# Patient Record
Sex: FEMALE | Race: WHITE | NOT HISPANIC OR LATINO
[De-identification: names, ages, dates, MRNs, and addresses within clinical notes are randomized per-mention and may not be internally consistent; named-entity substitution may affect disease eponyms.]

---

## 2023-09-19 PROBLEM — Z00.00 ENCOUNTER FOR PREVENTIVE HEALTH EXAMINATION: Status: ACTIVE | Noted: 2023-09-19

## 2023-09-29 ENCOUNTER — APPOINTMENT (OUTPATIENT)
Dept: CT IMAGING | Facility: CLINIC | Age: 48
End: 2023-09-29
Payer: COMMERCIAL

## 2023-09-29 PROCEDURE — 70450 CT HEAD/BRAIN W/O DYE: CPT

## 2024-01-09 ENCOUNTER — APPOINTMENT (OUTPATIENT)
Dept: PAIN MANAGEMENT | Facility: CLINIC | Age: 49
End: 2024-01-09

## 2024-03-28 ENCOUNTER — APPOINTMENT (OUTPATIENT)
Dept: INFECTIOUS DISEASE | Facility: CLINIC | Age: 49
End: 2024-03-28
Payer: COMMERCIAL

## 2024-03-28 VITALS
TEMPERATURE: 97.3 F | WEIGHT: 155 LBS | DIASTOLIC BLOOD PRESSURE: 91 MMHG | HEIGHT: 65 IN | BODY MASS INDEX: 25.83 KG/M2 | SYSTOLIC BLOOD PRESSURE: 131 MMHG | HEART RATE: 93 BPM | OXYGEN SATURATION: 100 %

## 2024-03-28 DIAGNOSIS — Z86.19 PERSONAL HISTORY OF OTHER INFECTIOUS AND PARASITIC DISEASES: ICD-10-CM

## 2024-03-28 LAB
ALBUMIN SERPL ELPH-MCNC: 4.8 G/DL
ALP BLD-CCNC: 58 U/L
ALT SERPL-CCNC: 20 U/L
ANION GAP SERPL CALC-SCNC: 12 MMOL/L
AST SERPL-CCNC: 23 U/L
BILIRUB SERPL-MCNC: 0.5 MG/DL
BUN SERPL-MCNC: 15 MG/DL
CALCIUM SERPL-MCNC: 9.9 MG/DL
CHLORIDE SERPL-SCNC: 101 MMOL/L
CO2 SERPL-SCNC: 27 MMOL/L
CREAT SERPL-MCNC: 0.75 MG/DL
EGFR: 98 ML/MIN/1.73M2
GLUCOSE SERPL-MCNC: 108 MG/DL
HCT VFR BLD CALC: 41.7 %
HGB BLD-MCNC: 14.2 G/DL
MCHC RBC-ENTMCNC: 31 PG
MCHC RBC-ENTMCNC: 34.1 GM/DL
MCV RBC AUTO: 91 FL
PLATELET # BLD AUTO: 217 K/UL
POTASSIUM SERPL-SCNC: 4.3 MMOL/L
PROT SERPL-MCNC: 7.2 G/DL
RBC # BLD: 4.58 M/UL
RBC # FLD: 12.2 %
SODIUM SERPL-SCNC: 139 MMOL/L
WBC # FLD AUTO: 4.63 K/UL

## 2024-03-28 PROCEDURE — 99203 OFFICE O/P NEW LOW 30 MIN: CPT

## 2024-03-28 RX ORDER — ATORVASTATIN CALCIUM 20 MG/1
20 TABLET, FILM COATED ORAL
Qty: 30 | Refills: 6 | Status: ACTIVE | COMMUNITY
Start: 2024-03-28

## 2024-03-28 RX ORDER — CYCLOBENZAPRINE HYDROCHLORIDE 10 MG/1
10 TABLET, FILM COATED ORAL DAILY
Refills: 0 | Status: ACTIVE | COMMUNITY
Start: 2024-03-28

## 2024-03-28 RX ORDER — ASCORBIC ACID 500 MG
500 TABLET ORAL
Refills: 0 | Status: ACTIVE | COMMUNITY
Start: 2024-03-28

## 2024-03-28 RX ORDER — VITAMIN B COMPLEX
CAPSULE ORAL
Refills: 0 | Status: ACTIVE | COMMUNITY
Start: 2024-03-28

## 2024-03-28 RX ORDER — BUTALBITAL, ACETAMINOPHEN AND CAFFEINE 300; 50; 40 MG/1; MG/1; MG/1
50-300-40 CAPSULE ORAL DAILY
Refills: 0 | Status: ACTIVE | COMMUNITY
Start: 2024-03-28

## 2024-03-28 RX ORDER — CHOLECALCIFEROL (VITAMIN D3) 10(400)/ML
10 DROPS ORAL
Refills: 0 | Status: ACTIVE | COMMUNITY
Start: 2024-03-28

## 2024-03-28 RX ORDER — MINOCYCLINE HYDROCHLORIDE 100 MG/1
100 TABLET ORAL TWICE DAILY
Refills: 0 | Status: ACTIVE | COMMUNITY
Start: 2024-03-28

## 2024-03-28 NOTE — ASSESSMENT
[FreeTextEntry1] : ID initial consult.   3/28/2024 Plan  Reviewed blood work results with patient.  Explained how to interpret syphilis results and when results demonstrate a new positive infection. 1. do blood work and cultures for all STI.  2. f/u one week via telemed      [Treatment Education] : treatment education [Sexuality / Safer Sex] : sexuality/safer sex

## 2024-03-28 NOTE — PHYSICAL EXAM
[General Appearance - Alert] : alert [General Appearance - In No Acute Distress] : in no acute distress [General Appearance - Well Nourished] : well nourished [Sclera] : the sclera and conjunctiva were normal [General Appearance - Well-Appearing] : healthy appearing [PERRL With Normal Accommodation] : pupils were equal in size, round, reactive to light [Extraocular Movements] : extraocular movements were intact [Outer Ear] : the ears and nose were normal in appearance [Hearing Threshold Finger Rub Not [place]] : hearing was normal [Examination Of The Oral Cavity] : the lips and gums were normal [Oropharynx] : the oropharynx was normal with no thrush [Both Tympanic Membranes Were Examined] : both tympanic membranes were normal [Neck Appearance] : the appearance of the neck was normal [Jugular Venous Distention Increased] : there was no jugular-venous distention [Neck Cervical Mass (___cm)] : no neck mass was observed [Thyroid Diffuse Enlargement] : the thyroid was not enlarged [Exaggerated Use Of Accessory Muscles For Inspiration] : no accessory muscle use [Respiration, Rhythm And Depth] : normal respiratory rhythm and effort [Heart Rate And Rhythm] : heart rate was normal and rhythm regular [Auscultation Breath Sounds / Voice Sounds] : lungs were clear to auscultation bilaterally [Heart Sounds] : normal S1 and S2 [Heart Sounds Gallop] : no gallops [Heart Sounds Pericardial Friction Rub] : no pericardial rub [Murmurs] : no murmurs [Edema] : there was no peripheral edema [Bowel Sounds] : normal bowel sounds [Abdomen Soft] : soft [Abdomen Tenderness] : non-tender [Costovertebral Angle Tenderness] : no CVA tenderness [No Palpable Adenopathy] : no palpable adenopathy [Range of Motion to Joints] : range of motion to joints [Musculoskeletal - Swelling] : no joint swelling [Motor Tone] : muscle strength and tone were normal [Skin Color & Pigmentation] : normal skin color and pigmentation [Nail Clubbing] : no clubbing  or cyanosis of the fingernails [] : no rash [Skin Lesions] : no skin lesions [Cranial Nerves] : cranial nerves 2-12 were intact [Sensation] : the sensory exam was normal to light touch and pinprick [Motor Exam] : the motor exam was normal [No Focal Deficits] : no focal deficits [Oriented To Time, Place, And Person] : oriented to person, place, and time [Affect] : the affect was normal [FreeTextEntry1] : pt jaun during consult

## 2024-03-28 NOTE — HISTORY OF PRESENT ILLNESS
[FreeTextEntry1] : 49 yo female here for ID initial consult  went to GYN and had routine STI screening noted with +RPR and RPR titer 1:1 was not treated for syphilis and told to go to ID for treatment  denies any symptoms  denies any c/o at this time  does not remember being treated for syphilis in the past.  Was treated many years ago for gonorrhea   Sexual hx : Heterosexual.  currently sexually active with usual partner- denies any condom use.  Hx of gonorrhea.     3/28/2024 Plan  Reviewed blood work results with patient.  Explained how to interpret syphilis results and when results demonstrate a new positive infection. 1. do blood work and cultures for all STI.  2. f/u one week via telemed

## 2024-03-29 ENCOUNTER — TRANSCRIPTION ENCOUNTER (OUTPATIENT)
Age: 49
End: 2024-03-29

## 2024-04-01 ENCOUNTER — TRANSCRIPTION ENCOUNTER (OUTPATIENT)
Age: 49
End: 2024-04-01

## 2024-04-01 LAB
C TRACH RRNA SPEC QL NAA+PROBE: NOT DETECTED
HBV CORE IGG+IGM SER QL: NONREACTIVE
HBV SURFACE AB SER QL: NONREACTIVE
HBV SURFACE AG SER QL: NONREACTIVE
HCV AB SER QL: NONREACTIVE
HCV S/CO RATIO: 0.08 S/CO
HEPATITIS A IGG ANTIBODY: NONREACTIVE
HEPB DNA PCR INT: NOT DETECTED
HEPB DNA PCR LOG: NOT DETECTED LOGIU/ML
HIV1+2 AB SPEC QL IA.RAPID: NONREACTIVE
HUMAN IMMUNODEFICIENCY VIRUS 1 (HIV-1) QUALITATIVE, RNA: NEGATIVE
N GONORRHOEA RRNA SPEC QL NAA+PROBE: NOT DETECTED
SOURCE AMPLIFICATION: NORMAL
SOURCE AMPLIFICATION: NORMAL
SOURCE ANAL: NORMAL
SOURCE ORAL: NORMAL
T PALLIDUM AB SER QL IA: NEGATIVE
T VAGINALIS RRNA SPEC QL NAA+PROBE: NOT DETECTED

## 2024-04-03 ENCOUNTER — APPOINTMENT (OUTPATIENT)
Dept: INFECTIOUS DISEASE | Facility: CLINIC | Age: 49
End: 2024-04-03
Payer: COMMERCIAL

## 2024-04-03 DIAGNOSIS — R76.8 OTHER SPECIFIED ABNORMAL IMMUNOLOGICAL FINDINGS IN SERUM: ICD-10-CM

## 2024-04-03 PROCEDURE — 99447 NTRPROF PH1/NTRNET/EHR 11-20: CPT

## 2024-04-04 ENCOUNTER — TRANSCRIPTION ENCOUNTER (OUTPATIENT)
Age: 49
End: 2024-04-04

## 2024-04-08 ENCOUNTER — NON-APPOINTMENT (OUTPATIENT)
Age: 49
End: 2024-04-08

## 2024-04-09 ENCOUNTER — NON-APPOINTMENT (OUTPATIENT)
Age: 49
End: 2024-04-09